# Patient Record
Sex: FEMALE | Race: WHITE
[De-identification: names, ages, dates, MRNs, and addresses within clinical notes are randomized per-mention and may not be internally consistent; named-entity substitution may affect disease eponyms.]

---

## 2017-08-23 ENCOUNTER — HOSPITAL ENCOUNTER (OUTPATIENT)
Dept: HOSPITAL 47 - RADBDWWP | Age: 62
Discharge: HOME | End: 2017-08-23
Payer: COMMERCIAL

## 2017-08-23 DIAGNOSIS — Z78.0: ICD-10-CM

## 2017-08-23 DIAGNOSIS — C50.919: ICD-10-CM

## 2017-08-23 DIAGNOSIS — M85.89: Primary | ICD-10-CM

## 2017-08-23 PROCEDURE — 77080 DXA BONE DENSITY AXIAL: CPT

## 2017-08-24 NOTE — BD
EXAMINATION TYPE: MG DEXA axial skeleton.  

 

DATE OF EXAM: 2017

 

COMPARISON: 

 

CLINICAL HISTORY: breast ca, osteopenia

 

Height:  5'1

Weight:  183

 

FRAX RISK QUESTIONS:

Alcohol (3 or more units per day):  no

Family History (Parent hip fracture):  no

Glucocorticoids (More than 3mos):  no

           (Ex: prednisone, prednisolone, methylprednisolone, dexamethasone, and hydrocortisone).    
     

History of Fracture in Adulthood: yes

Secondary Osteoporosis:

  1.  Type 1 Diabetes: no

  2.  Hyperthyroidism: no

  3.  Menopause before 45: yes

  4.  Malnutrition: no

  5.  Chronic liver disease: no

Rheumatoid Arthritis: no

Current Tobacco Use: no

 

RISK FACTORS 

HISTORY OF: 

Family History of Osteoporosis: 

Diet low in dairy products/other sources of calcium:

Postmenopausal woman: 

 

 

MEDICATIONS: 

Thyroid Medications:  

Which medication: Synthroid

How Lon years

Additional Medications:

 

 

Additional History: breast cancer 

 

 

EXAM MEASUREMENTS: 

Bone mineral densitometry was performed using the [employer] System.

Bone mineral density as measured about the Lumbar spine is:  

----- L1-L4(G/cm2): 1.037

T Score Values are as follows:

----- L2: -2.0

----- L3: -2.0

----- L4: 0.5

----- L1-L4:-1.2

Bone mineral density has: Increased 4.3% since study of: 2015

 

Bone mineral density about the R hip (g/cm2): 0.841

Bone mineral density about the L hip (g/cm2): 0.823

T Score values are as follows:

-----R Neck: -1.4

-----L Neck: -1.5

-----R Total: -1.1

-----L Total: -1.4

Bone mineral density has: Increased 4.6% since study of: 2015

 

 

IMPRESSION:

Osteopenia (T Score between -2.5 and -1 as noted by T score values;L1-L4, Luiz Hips

There is slightly increased risk of fracture and the patient may be considered 

for treatment. Re-Screen 2-5 years.

 

 

 

 

 

NOTE:  T-SCORE=SD OF THE YOUNG ADULT MEAN.

## 2020-12-04 ENCOUNTER — HOSPITAL ENCOUNTER (OUTPATIENT)
Dept: HOSPITAL 47 - LABWHC1 | Age: 65
Discharge: HOME | End: 2020-12-04
Attending: SURGERY
Payer: MEDICARE

## 2020-12-04 DIAGNOSIS — Z20.828: Primary | ICD-10-CM

## 2020-12-11 ENCOUNTER — HOSPITAL ENCOUNTER (OUTPATIENT)
Dept: HOSPITAL 47 - ORWHC2ENDO | Age: 65
End: 2020-12-11
Attending: SURGERY
Payer: MEDICARE

## 2020-12-11 VITALS — TEMPERATURE: 97.8 F

## 2020-12-11 VITALS — RESPIRATION RATE: 16 BRPM

## 2020-12-11 VITALS — BODY MASS INDEX: 33.6 KG/M2

## 2020-12-11 VITALS — SYSTOLIC BLOOD PRESSURE: 158 MMHG | DIASTOLIC BLOOD PRESSURE: 74 MMHG | HEART RATE: 84 BPM

## 2020-12-11 DIAGNOSIS — K64.8: ICD-10-CM

## 2020-12-11 DIAGNOSIS — Z90.13: ICD-10-CM

## 2020-12-11 DIAGNOSIS — Z86.010: ICD-10-CM

## 2020-12-11 DIAGNOSIS — Z85.3: ICD-10-CM

## 2020-12-11 DIAGNOSIS — Z85.44: ICD-10-CM

## 2020-12-11 DIAGNOSIS — Z79.890: ICD-10-CM

## 2020-12-11 DIAGNOSIS — E07.9: ICD-10-CM

## 2020-12-11 DIAGNOSIS — Z92.21: ICD-10-CM

## 2020-12-11 DIAGNOSIS — Z90.79: ICD-10-CM

## 2020-12-11 DIAGNOSIS — Z12.11: Primary | ICD-10-CM

## 2020-12-11 DIAGNOSIS — Z91.89: ICD-10-CM

## 2020-12-11 DIAGNOSIS — Z90.710: ICD-10-CM

## 2020-12-11 DIAGNOSIS — Z88.0: ICD-10-CM

## 2020-12-11 DIAGNOSIS — Z98.890: ICD-10-CM

## 2020-12-11 DIAGNOSIS — E78.5: ICD-10-CM

## 2020-12-11 DIAGNOSIS — I10: ICD-10-CM

## 2020-12-11 DIAGNOSIS — Z79.899: ICD-10-CM

## 2020-12-11 NOTE — P.GSHP
History of Present Illness


H&P Date: 12/11/20





65-year-old female presents today for screening colonoscopy.  Last colonoscopy 

was about 7 years ago.  Patient denies any recent blood in stool.  Denies any 

family history of colon cancer.





- Review of Systems


All systems: negative





Past Medical History


Past Medical History: Cancer, Hyperlipidemia, Hypertension, Thyroid Disorder


Additional Past Medical History / Comment(s): hx. breast cancer 1994-had surg. &

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


History of Any Multi-Drug Resistant Organisms: None Reported


Past Surgical History: Breast Surgery, Hysterectomy


Additional Past Surgical History / Comment(s): debi mastectomies, exploratory 

laparotomy, hyst. w/salpingo-oophorectomy


Past Anesthesia/Blood Transfusion Reactions: Postoperative Nausea & Vomiting 

(PONV)


Smoking Status: Never smoker





Medications and Allergies


                                Home Medications











 Medication  Instructions  Recorded  Confirmed  Type


 


Atorvastatin [Lipitor] 10 mg PO HS 12/08/20 12/08/20 History


 


Levothyroxine Sodium [Synthroid] 100 mcg PO DAILY 12/08/20 12/08/20 History


 


amLODIPine BESYLATE/BENAZEPRIL 1 each PO DAILY 12/08/20 12/08/20 History





[amLODIPine BESYLATE/BENAZEPRIL    





5-10 MG]    








                                    Allergies











Allergy/AdvReac Type Severity Reaction Status Date / Time


 


Penicillins Allergy  Itching Verified 12/08/20 14:34














Surgical - Exam


Osteopathic Statement: *.  No significant issues noted on an osteopathic 

structural exam other than those noted in the History and Physical/Consult.


                                   Vital Signs











Temp Pulse Resp BP Pulse Ox


 


 97.8 F   83   18   151/81   97 


 


 12/11/20 10:36  12/11/20 10:36  12/11/20 10:36  12/11/20 10:36  12/11/20 10:36














- General


well nourished, no distress





- ENT


no hearing loss





- Neck


trachea midline





- Respiratory


normal respiratory effort





- Abdomen


Abdomen: soft, non tender





- Psychiatric


oriented to time, oriented to person, oriented to place





Assessment and Plan


Plan: 





65-year-old female presents for screening colonoscopy.  Risks, benefits and 

alternatives were provided to the patient.  Further recommendations after 

procedure.

## 2020-12-11 NOTE — P.PCN
Date of Procedure: 12/11/20


Preoperative Diagnosis: 


Screening


Postoperative Diagnosis: 


Internal hemorrhoids


Procedure(s) Performed: 


Colonoscopy


Anesthesia: MAC


Surgeon: Yemi Montano


Pathology: none sent


Condition: stable


Disposition: same day


Indications for Procedure: 


65-year-old female presents for screening colonoscopy.  Risks, benefits and 

alternatives were provided to the patient.  She did provide consent prior to 

attending the endoscopy suite.


Operative Findings: 


Overall, normal colon


Internal hemorrhoids


Description of Procedure: 


The patient was brought into the endoscopy suite.  He was then placed in left 

lateral decubitus position and adequate sedation was achieved using conscious 

sedation.  A digital rectal exam was performed and mild internal hemorrhoids 

were palpated.  An endoscope was then placed in the rectum and advanced to the 

cecum as identified by landmarks including the appendiceal orifice and the 

ileocecal valve.  The prep was good.  The colonoscope was then slowly withdrawn,

examining for any mucosal abnormalities.  The cecum, ascending, transverse, 

descending and sigmoid colon were visualized adequately.  There were no large 

neoplastic lesions throughout the colon.  There were no obvious polyps noted 

throughout the colon.  There was no evidence of diverticulosis.  Retroflexion 

was performed in the rectum and mild internal hemorrhoids were visible.  Excess 

air was removed, the colonoscope withdrawn and the procedure terminated.  The 

patient was then transferred to the recovery unit in stable condition.  Repeat 

colonoscopy should be performed in 7 years.

## 2022-02-23 ENCOUNTER — HOSPITAL ENCOUNTER (OUTPATIENT)
Dept: HOSPITAL 47 - RADBDWWP | Age: 67
Discharge: HOME | End: 2022-02-23
Attending: INTERNAL MEDICINE
Payer: MEDICARE

## 2022-02-23 DIAGNOSIS — C50.919: Primary | ICD-10-CM

## 2022-02-23 DIAGNOSIS — Z79.890: ICD-10-CM

## 2022-02-23 DIAGNOSIS — M85.80: ICD-10-CM

## 2022-02-23 PROCEDURE — 77080 DXA BONE DENSITY AXIAL: CPT

## 2022-02-24 NOTE — BD
EXAMINATION TYPE: Axial Bone Density

 

DATE OF EXAM: 2022

 

COMPARISON: 2017

 

CLINICAL HISTORY: Postmenopausal screening

 

Height:  61  IN

Weight:  145 LBS

 

FRAX RISK QUESTIONS:

History of Fracture in Adulthood: MARY ANKLE FX AGE 58

Secondary Osteoporosis:

  3.  Menopause before 45: TOTAL HYST AGE 36

  

RISK FACTORS 

HISTORY OF: 

Active: YES

Postmenopausal woman: TOTAL HYST AGE 36

 

MEDICATIONS: 

Thyroid Medications:  YES

Which medication: Levothyroxine

How Lon+ YEARS

Additional Medications: LEVOTHYROXINE, BLOOD PRESSURE MEDS, CHOLESTEROL MEDS 

 

 

Additional History: BREAST CANCER WITH CHEMO, UTERINE CANCER WITH CHEMO

 

 

EXAM MEASUREMENTS: 

Bone mineral densitometry was performed using the [employer] System.

Bone mineral density as measured about the Lumbar spine is:  

----- L1-L4(G/cm2): 1.012

T Score Values are as follows:

----- L2: -2.7

----- L3: -1.8

----- L4: 1.1

----- L1-L4: -1.4

Bone mineral density has: Decreased -0.5% since study of: 2017

 

Bone mineral density about the R hip (g/cm2): 0.748

Bone mineral density about the L hip (g/cm2): 0.756

T Score values are as follows:

-----R Neck: -2.1

-----L Neck: -2.0

-----R Total: -2.4

-----L Total: -2.0

Bone mineral density has: Decreased -14.4% since study of: 2017

 

 

IMPRESSION:

Osteopenia (T Score between -2.5 and -1). Note that measurements border on osteoporosis at the right 
hip.

 

There is slightly increased risk of fracture and the patient may be considered 

for treatment. 

 

Re-Screen 2-5 years.

 

NOTE:  T-SCORE=SD OF THE YOUNG ADULT MEAN.